# Patient Record
Sex: FEMALE | Race: WHITE | Employment: OTHER | URBAN - METROPOLITAN AREA
[De-identification: names, ages, dates, MRNs, and addresses within clinical notes are randomized per-mention and may not be internally consistent; named-entity substitution may affect disease eponyms.]

---

## 2022-12-24 ENCOUNTER — APPOINTMENT (OUTPATIENT)
Dept: GENERAL RADIOLOGY | Age: 77
End: 2022-12-24
Payer: MEDICARE

## 2022-12-24 ENCOUNTER — APPOINTMENT (OUTPATIENT)
Dept: CT IMAGING | Age: 77
End: 2022-12-24
Payer: MEDICARE

## 2022-12-24 ENCOUNTER — HOSPITAL ENCOUNTER (EMERGENCY)
Age: 77
Discharge: HOME | End: 2022-12-24
Attending: STUDENT IN AN ORGANIZED HEALTH CARE EDUCATION/TRAINING PROGRAM
Payer: MEDICARE

## 2022-12-24 VITALS
DIASTOLIC BLOOD PRESSURE: 78 MMHG | BODY MASS INDEX: 27.58 KG/M2 | SYSTOLIC BLOOD PRESSURE: 124 MMHG | HEIGHT: 68 IN | OXYGEN SATURATION: 99 % | WEIGHT: 182 LBS | RESPIRATION RATE: 18 BRPM | TEMPERATURE: 98.3 F | HEART RATE: 78 BPM

## 2022-12-24 DIAGNOSIS — M54.31 BILATERAL SCIATICA: ICD-10-CM

## 2022-12-24 DIAGNOSIS — M54.10 RADICULOPATHY, UNSPECIFIED SPINAL REGION: ICD-10-CM

## 2022-12-24 DIAGNOSIS — R20.0 NUMBNESS: Primary | ICD-10-CM

## 2022-12-24 DIAGNOSIS — M54.32 BILATERAL SCIATICA: ICD-10-CM

## 2022-12-24 LAB
A/G RATIO: 1.6 (ref 1.1–2.2)
ALBUMIN SERPL-MCNC: 3.8 G/DL (ref 3.4–5)
ALP BLD-CCNC: 131 U/L (ref 40–129)
ALT SERPL-CCNC: 10 U/L (ref 10–40)
ANION GAP SERPL CALCULATED.3IONS-SCNC: 13 MMOL/L (ref 3–16)
AST SERPL-CCNC: 13 U/L (ref 15–37)
BASOPHILS ABSOLUTE: 0.2 K/UL (ref 0–0.2)
BASOPHILS RELATIVE PERCENT: 2.5 %
BILIRUB SERPL-MCNC: 1.3 MG/DL (ref 0–1)
BUN BLDV-MCNC: 14 MG/DL (ref 7–20)
CALCIUM SERPL-MCNC: 9.6 MG/DL (ref 8.3–10.6)
CHLORIDE BLD-SCNC: 106 MMOL/L (ref 99–110)
CO2: 21 MMOL/L (ref 21–32)
CREAT SERPL-MCNC: 0.8 MG/DL (ref 0.6–1.2)
EOSINOPHILS ABSOLUTE: 0.2 K/UL (ref 0–0.6)
EOSINOPHILS RELATIVE PERCENT: 2.6 %
GFR SERPL CREATININE-BSD FRML MDRD: >60 ML/MIN/{1.73_M2}
GLUCOSE BLD-MCNC: 100 MG/DL (ref 70–99)
GLUCOSE BLD-MCNC: 104 MG/DL (ref 70–99)
HCT VFR BLD CALC: 41.5 % (ref 36–48)
HEMOGLOBIN: 14.2 G/DL (ref 12–16)
LYMPHOCYTES ABSOLUTE: 1.8 K/UL (ref 1–5.1)
LYMPHOCYTES RELATIVE PERCENT: 19.7 %
MAGNESIUM: 2 MG/DL (ref 1.8–2.4)
MCH RBC QN AUTO: 30.5 PG (ref 26–34)
MCHC RBC AUTO-ENTMCNC: 34.2 G/DL (ref 31–36)
MCV RBC AUTO: 89.1 FL (ref 80–100)
MONOCYTES ABSOLUTE: 0.5 K/UL (ref 0–1.3)
MONOCYTES RELATIVE PERCENT: 5.4 %
NEUTROPHILS ABSOLUTE: 6.3 K/UL (ref 1.7–7.7)
NEUTROPHILS RELATIVE PERCENT: 69.8 %
PDW BLD-RTO: 13.9 % (ref 12.4–15.4)
PERFORMED ON: ABNORMAL
PLATELET # BLD: 271 K/UL (ref 135–450)
PMV BLD AUTO: 10.5 FL (ref 5–10.5)
POTASSIUM REFLEX MAGNESIUM: 3.5 MMOL/L (ref 3.5–5.1)
RBC # BLD: 4.65 M/UL (ref 4–5.2)
SODIUM BLD-SCNC: 140 MMOL/L (ref 136–145)
TOTAL PROTEIN: 6.2 G/DL (ref 6.4–8.2)
TROPONIN: <0.01 NG/ML
WBC # BLD: 9.1 K/UL (ref 4–11)

## 2022-12-24 PROCEDURE — 84484 ASSAY OF TROPONIN QUANT: CPT

## 2022-12-24 PROCEDURE — 36415 COLL VENOUS BLD VENIPUNCTURE: CPT

## 2022-12-24 PROCEDURE — 70450 CT HEAD/BRAIN W/O DYE: CPT

## 2022-12-24 PROCEDURE — 83735 ASSAY OF MAGNESIUM: CPT

## 2022-12-24 PROCEDURE — 93005 ELECTROCARDIOGRAM TRACING: CPT | Performed by: STUDENT IN AN ORGANIZED HEALTH CARE EDUCATION/TRAINING PROGRAM

## 2022-12-24 PROCEDURE — 71045 X-RAY EXAM CHEST 1 VIEW: CPT

## 2022-12-24 PROCEDURE — 85025 COMPLETE CBC W/AUTO DIFF WBC: CPT

## 2022-12-24 PROCEDURE — 80053 COMPREHEN METABOLIC PANEL: CPT

## 2022-12-24 PROCEDURE — 99285 EMERGENCY DEPT VISIT HI MDM: CPT

## 2022-12-24 RX ORDER — IBUPROFEN 200 MG
200 TABLET ORAL EVERY 6 HOURS PRN
COMMUNITY

## 2022-12-24 RX ORDER — LOSARTAN POTASSIUM 100 MG/1
100 TABLET ORAL DAILY
COMMUNITY

## 2022-12-24 RX ORDER — CARVEDILOL 12.5 MG/1
12.5 TABLET ORAL 2 TIMES DAILY WITH MEALS
COMMUNITY

## 2022-12-24 RX ORDER — SPIRONOLACTONE 25 MG/1
25 TABLET ORAL DAILY
COMMUNITY

## 2022-12-24 RX ORDER — LEVOTHYROXINE SODIUM 0.12 MG/1
125 TABLET ORAL DAILY
COMMUNITY

## 2022-12-24 RX ORDER — LOVASTATIN 40 MG/1
40 TABLET ORAL NIGHTLY
COMMUNITY

## 2022-12-24 ASSESSMENT — PAIN - FUNCTIONAL ASSESSMENT
PAIN_FUNCTIONAL_ASSESSMENT: NONE - DENIES PAIN

## 2022-12-24 NOTE — ED PROVIDER NOTES
[facility]      CHIEF COMPLAINT  Numbness (Patient reports that an hour ago she had an episode numbness across her shoulders that lasted 20 minutes and is now gone. Patient also reports that she was admitted and treated at [facility] 2 weeks ago for the same thing. )     [facility]  Chayito Mathew is a 68 y.o. female  who presents to the ED complaining of intermittent right-sided numbness. Patient states that she has been having these episodes on and off for the past several months. She states that about an hour ago she had an episode of numbness and paresthesias on the right side of her body as well as her left hand which has now resolved. She denies any focal weaknesses. She states that she has been undergoing extensive testing for this and they have been unable to find a cause and that she was admitted at [facility] a few weeks ago at which point she had CT as well as an MRI of her head performed and she states that they could not determine the etiology of her symptoms. She denies any current complaints. Denies any associated chest pain or shortness of breath. She denies other complaints or concerns and states that she is currently back to her baseline. No other complaints, modifying factors or associated symptoms. I have reviewed the following from the nursing documentation. Past Medical History:   Diagnosis Date    Hyperlipidemia     Hypertension     Thyroid condition      Past Surgical History:   Procedure Laterality Date    HYSTERECTOMY (CERVIX STATUS UNKNOWN)       History reviewed. No pertinent family history. Social History     Socioeconomic History    Marital status:       Spouse name: Not on file    Number of children: Not on file    Years of education: Not on file    Highest education level: Not on file   Occupational History    Not on file   Tobacco Use    Smoking status: Every Day     Packs/day: 0.50     Types: Cigarettes    Smokeless tobacco: Never   Vaping Use    Vaping Use: Never used   Substance and Sexual Activity    Alcohol use: Never    Drug use: Never    Sexual activity: Not Currently   Other Topics Concern    Not on file   Social History Narrative    Not on file     Social Determinants of Health     Financial Resource Strain: Not on file   Food Insecurity: Not on file   Transportation Needs: Not on file   Physical Activity: Not on file   Stress: Not on file   Social Connections: Not on file   Intimate Partner Violence: Not on file   Housing Stability: Not on file     No current facility-administered medications for this encounter. Current Outpatient Medications   Medication Sig Dispense Refill    carvedilol (COREG) 12.5 MG tablet Take 12.5 mg by mouth 2 times daily (with meals)      ibuprofen (ADVIL;MOTRIN) 200 MG tablet Take 200 mg by mouth every 6 hours as needed for Pain      dapagliflozin (FARXIGA) 10 MG tablet Take 10 mg by mouth every morning      levothyroxine (SYNTHROID) 125 MCG tablet Take 125 mcg by mouth Daily      losartan (COZAAR) 100 MG tablet Take 100 mg by mouth daily      spironolactone (ALDACTONE) 25 MG tablet Take 25 mg by mouth daily      lovastatin (MEVACOR) 40 MG tablet Take 40 mg by mouth nightly       No Known Allergies    REVIEW OF SYSTEMS  10 systems reviewed, pertinent positives per HPI otherwise noted to be negative. PHYSICAL EXAM  /78   Pulse 78   Temp 98.3 °F (36.8 °C) (Oral)   Resp 18   Ht 5' 8\" (1.727 m)   Wt 182 lb (82.6 kg)   SpO2 99%   BMI 27.67 kg/m²    GENERAL APPEARANCE: Awake and alert. Cooperative. No acute distress. HENT: Normocephalic. Atraumatic. Mucous membranes are moist.  NECK: Supple. EYES: PERRL. EOM's grossly intact. HEART/CHEST: RRR. No murmurs. LUNGS: Respirations unlabored. CTAB. Good air exchange. Speaking comfortably in full sentences. ABDOMEN: No tenderness. Soft. Non-distended. No masses. No organomegaly. No guarding or rebound.    MUSCULOSKELETAL: No extremity edema. Compartments soft. No deformity. No tenderness in the extremities. All extremities neurovascularly intact. SKIN: Warm and dry. No acute rashes. NEUROLOGICAL: Alert and oriented. CN's 2-12 intact. No gross facial drooping. Strength 5/5, sensation intact. Gait normal. NIHSS=0  PSYCHIATRIC: Normal mood and affect. LABS  I have reviewed all labs for this visit.    Results for orders placed or performed during the hospital encounter of 12/24/22   CBC with Auto Differential   Result Value Ref Range    WBC 9.1 4.0 - 11.0 K/uL    RBC 4.65 4.00 - 5.20 M/uL    Hemoglobin 14.2 12.0 - 16.0 g/dL    Hematocrit 41.5 36.0 - 48.0 %    MCV 89.1 80.0 - 100.0 fL    MCH 30.5 26.0 - 34.0 pg    MCHC 34.2 31.0 - 36.0 g/dL    RDW 13.9 12.4 - 15.4 %    Platelets 439 460 - 336 K/uL    MPV 10.5 5.0 - 10.5 fL    Neutrophils % 69.8 %    Lymphocytes % 19.7 %    Monocytes % 5.4 %    Eosinophils % 2.6 %    Basophils % 2.5 %    Neutrophils Absolute 6.3 1.7 - 7.7 K/uL    Lymphocytes Absolute 1.8 1.0 - 5.1 K/uL    Monocytes Absolute 0.5 0.0 - 1.3 K/uL    Eosinophils Absolute 0.2 0.0 - 0.6 K/uL    Basophils Absolute 0.2 0.0 - 0.2 K/uL   Comprehensive Metabolic Panel w/ Reflex to MG   Result Value Ref Range    Sodium 140 136 - 145 mmol/L    Potassium reflex Magnesium 3.5 3.5 - 5.1 mmol/L    Chloride 106 99 - 110 mmol/L    CO2 21 21 - 32 mmol/L    Anion Gap 13 3 - 16    Glucose 100 (H) 70 - 99 mg/dL    BUN 14 7 - 20 mg/dL    Creatinine 0.8 0.6 - 1.2 mg/dL    Est, Glom Filt Rate >60 >60    Calcium 9.6 8.3 - 10.6 mg/dL    Total Protein 6.2 (L) 6.4 - 8.2 g/dL    Albumin 3.8 3.4 - 5.0 g/dL    Albumin/Globulin Ratio 1.6 1.1 - 2.2    Total Bilirubin 1.3 (H) 0.0 - 1.0 mg/dL    Alkaline Phosphatase 131 (H) 40 - 129 U/L    ALT 10 10 - 40 U/L    AST 13 (L) 15 - 37 U/L   Troponin   Result Value Ref Range    Troponin <0.01 <0.01 ng/mL   Magnesium   Result Value Ref Range    Magnesium 2.00 1.80 - 2.40 mg/dL   POCT Glucose   Result Value Ref Range POC Glucose 104 (H) 70 - 99 mg/dl    Performed on ACCU-CHEK    EKG 12 Lead   Result Value Ref Range    Ventricular Rate 66 BPM    Atrial Rate 66 BPM    P-R Interval 228 ms    QRS Duration 102 ms    Q-T Interval 422 ms    QTc Calculation (Bazett) 442 ms    P Axis 28 degrees    R Axis -37 degrees    T Axis 143 degrees    Diagnosis       Sinus rhythm with 1st degree A-V blockLeft axis deviationIncomplete right bundle branch blockLeft ventricular hypertrophy with repolarization abnormalityAbnormal ECGNo previous ECGs available       ECG  The Ekg interpreted by me shows  normal sinus rhythm with a rate of 66  Axis is   Left axis deviation  QTc is  normal  First-degree AV block  ST Segments: nonspecific changes  No previous available for comparison    RADIOLOGY  CT HEAD WO CONTRAST   Final Result   Mild atrophy and moderate chronic microischemic disease scattered in the deep   white matter with no acute intracranial abnormality. Basal ganglia calcifications bilaterally. XR CHEST PORTABLE   Final Result   Mild hyperinflation and a calcified granuloma along the left lung base with   no acute pulmonary abnormality seen. ED COURSE/MDM  Patient seen and evaluated. Old records reviewed. Labs and imaging reviewed and results discussed with patient. Patient is a 80-year-old female presenting with several months history of intermittent right-sided numbness and tingling. She states that these episodes come and go with no exacerbating or relieving factors for the past few months. Was admitted to [facility] recently and had a full work-up there including MRI of her brain with no definitive diagnosis. Presents today with concerns for about 20-minute episode of right-sided tingling that has now resolved. Upon arrival in the ED, vitals reassuring. Patient is resting comfortably and is in no acute distress. NIH stroke scale 0. Labs are performed and are reassuring.   CT of the head shows mild atrophy and moderate chronic micro ischemic disease scattered in the deep white matter with no acute intracranial abnormality and bilateral basal ganglia calcifications. Given the fact the patient has had recent full work-up for her symptoms, do not feel that admission is warranted. Prior to radiology results patient was signed out to oncoming physician with plan for likely discharge home given the fact that she has had recent full work-up for similar complaints. I, Dr. Natalya Alanis MD, am the primary clinician of record. Is this patient to be included in the SEP-1 Core Measure? No   Exclusion criteria - the patient is NOT to be included for SEP-1 Core Measure due to: Infection is not suspected     During the patient's ED course, the patient was given:  Medications - No data to display     CLINICAL IMPRESSION  1. Numbness    2. Radiculopathy, unspecified spinal region    3. Bilateral sciatica        Blood pressure 124/78, pulse 78, temperature 98.3 °F (36.8 °C), temperature source Oral, resp. rate 18, height 5' 8\" (1.727 m), weight 182 lb (82.6 kg), SpO2 99 %. Noemia January was discharged to home in good condition. Patient was given scripts for the following medications. I counseled patient how to take these medications. Discharge Medication List as of 12/24/2022  7:57 PM          Follow-up with:  RODGER Guerra CNP  [address] [facility] [place]  [phone]    Schedule an appointment as soon as possible for a visit in 3 days  For recheck    [facility]  [phone]        DISCLAIMER: This chart was created using Dragon dictation software. Efforts were made by me to ensure accuracy, however some errors may be present due to limitations of this technology and occasionally words are not transcribed correctly.         Natalya Alanis MD  12/25/22 0800

## 2022-12-25 LAB
EKG ATRIAL RATE: 66 BPM
EKG DIAGNOSIS: NORMAL
EKG P AXIS: 28 DEGREES
EKG P-R INTERVAL: 228 MS
EKG Q-T INTERVAL: 422 MS
EKG QRS DURATION: 102 MS
EKG QTC CALCULATION (BAZETT): 442 MS
EKG R AXIS: -37 DEGREES
EKG T AXIS: 143 DEGREES
EKG VENTRICULAR RATE: 66 BPM

## 2022-12-25 NOTE — ED PROVIDER NOTES
7:48 PM: I discussed the history, physical examination, laboratory and imaging studies, and treatment plan with Dr. Margarita Chaudhry. Malick Martinez was signed out to me in stable condition. Please see Dr. Gabe Yoder documentation for details of their history, physical, and laboratory studies. Upon re-examination, a summary of Mariella Calix's history, physical examination, and studies are as follows: Patient presenting for evaluation of numbness for which she has had an extensive neurologic work-up for. Currently she is stating that she is numb in her bilateral lower extremities from her low back down. Possible radicular type symptoms. CT scan of the head and chest x-ray pending at time of signout, with likely plan to discharge with close outpatient follow-up given her most recent hospitalization for admission. Imaging:  CT HEAD WO CONTRAST    Result Date: 12/24/2022  EXAMINATION: CT OF THE HEAD WITHOUT CONTRAST  12/24/2022 6:09 pm TECHNIQUE: CT of the head was performed without the administration of intravenous contrast. Automated exposure control, iterative reconstruction, and/or weight based adjustment of the mA/kV was utilized to reduce the radiation dose to as low as reasonably achievable. COMPARISON: None HISTORY: ORDERING SYSTEM PROVIDED HISTORY: R sided numbness TECHNOLOGIST PROVIDED HISTORY: Reason for exam:->R sided numbness Has a \"code stroke\" or \"stroke alert\" been called? ->No Decision Support Exception - unselect if not a suspected or confirmed emergency medical condition->Emergency Medical Condition (MA) FINDINGS: BRAIN/VENTRICLES: The ventricles are normal.  There is mild prominence of the cortical sulci. There is moderate periventricular low density bilaterally. No intracranial hemorrhage or edema is seen. There is no extra-axial fluid collection or mass. There are punctate calcifications along the basal ganglia bilaterally.  ORBITS: The visualized portion of the orbits demonstrate no acute abnormality. SINUSES: The visualized paranasal sinuses are clear. There is moderate mucosal thickening in the mastoid air cells bilaterally with diffuse sclerosis on the left. SOFT TISSUES/SKULL:  No acute abnormality of the visualized skull or soft tissues. Mild atrophy and moderate chronic microischemic disease scattered in the deep white matter with no acute intracranial abnormality. Basal ganglia calcifications bilaterally. XR CHEST PORTABLE    Result Date: 12/24/2022  EXAMINATION: ONE XRAY VIEW OF THE CHEST 12/24/2022 6:09 pm COMPARISON: None. HISTORY: ORDERING SYSTEM PROVIDED HISTORY: R sided tingling TECHNOLOGIST PROVIDED HISTORY: Reason for exam:->R sided tingling FINDINGS: The heart is borderline enlarged. The pulmonary vessels are normal.  The lungs are mildly hyperinflated. No consolidation or effusion is seen. There is a calcified granuloma along the left lung base. Mild hyperinflation and a calcified granuloma along the left lung base with no acute pulmonary abnormality seen. MDM:  At this time CT scan of the head as well as chest x-ray are negative for any acute finding. An extensive discussion with the patient regarding her symptoms as well as plan for continuation of outpatient management work-up. I did not feel that rehospitalization was indicated at this time but certainly she may benefit from additional testing or management. Given the current radicular symptoms and concern for possible even sciatica, I will refer to physical therapy for assessment and follow-up. Patient was very much in agreement of that plan. Reasons to return to the ER were discussed and all questions answered at time of discharge. Final Impression    1. Numbness    2. Radiculopathy, unspecified spinal region    3. Bilateral sciatica        Blood pressure (!) 144/67, pulse 76, temperature 98.2 °F (36.8 °C), temperature source Oral, resp.  rate 16, height 5' 8\" (1.727 m), weight 182 lb (82.6 kg),

## 2023-11-29 ENCOUNTER — TELEPHONE (OUTPATIENT)
Age: 78
End: 2023-11-29

## 2023-11-29 NOTE — TELEPHONE ENCOUNTER
Npt ref by Florentino Johnson CVA. Attempted to contact pt. No Brayan Mandel lives at that number, wrong number [phone]. Attempted to contact emergency contact, Jose Mcgee number disconnected. Called pcp to confirm number. Correct number.

## 2023-12-08 NOTE — TELEPHONE ENCOUNTER
Attempted to contact pt. This is the wrong number listed on referral for pt. No one by that name lives at this number    Sending referral back to pcp.unable to contact pt.      Closing encounter